# Patient Record
Sex: MALE | Race: OTHER | HISPANIC OR LATINO | URBAN - METROPOLITAN AREA
[De-identification: names, ages, dates, MRNs, and addresses within clinical notes are randomized per-mention and may not be internally consistent; named-entity substitution may affect disease eponyms.]

---

## 2019-04-10 ENCOUNTER — EMERGENCY (EMERGENCY)
Facility: HOSPITAL | Age: 34
LOS: 1 days | Discharge: HOME | End: 2019-04-10
Attending: EMERGENCY MEDICINE
Payer: SELF-PAY

## 2019-04-10 VITALS
RESPIRATION RATE: 18 BRPM | SYSTOLIC BLOOD PRESSURE: 124 MMHG | TEMPERATURE: 98 F | HEIGHT: 65 IN | DIASTOLIC BLOOD PRESSURE: 66 MMHG | WEIGHT: 166.01 LBS | HEART RATE: 62 BPM | OXYGEN SATURATION: 95 %

## 2019-04-10 DIAGNOSIS — Z98.890 OTHER SPECIFIED POSTPROCEDURAL STATES: Chronic | ICD-10-CM

## 2019-04-10 PROCEDURE — 99284 EMERGENCY DEPT VISIT MOD MDM: CPT | Mod: 25

## 2019-04-10 PROCEDURE — T1013: CPT

## 2019-04-10 PROCEDURE — 99284 EMERGENCY DEPT VISIT MOD MDM: CPT

## 2019-04-10 PROCEDURE — 70486 CT MAXILLOFACIAL W/O DYE: CPT

## 2019-04-10 PROCEDURE — 70486 CT MAXILLOFACIAL W/O DYE: CPT | Mod: 26

## 2019-04-10 RX ORDER — IBUPROFEN 200 MG
600 TABLET ORAL ONCE
Qty: 0 | Refills: 0 | Status: COMPLETED | OUTPATIENT
Start: 2019-04-10 | End: 2019-04-10

## 2019-04-10 RX ADMIN — Medication 600 MILLIGRAM(S): at 12:07

## 2019-04-10 NOTE — ED ADULT NURSE NOTE - CHPI ED NUR SYMPTOMS NEG
no vomiting/no weakness/no change in level of consciousness/no seizure/no blurred vision/no abrasion/no loss of consciousness

## 2019-04-10 NOTE — ED STATDOCS - ATTENDING CONTRIBUTION TO CARE
I, Sagrario Miles, performed the initial face to face bedside interview with this patient regarding history of present illness, review of symptoms and relevant past medical, social and family history.  I completed an independent physical examination.  I was the initial provider who evaluated this patient. I have signed out the follow up of any pending tests (i.e. labs, radiological studies) to the ACP.  I have communicated the patient’s plan of care and disposition with the ACP.

## 2019-04-10 NOTE — ED ADULT NURSE NOTE - NSIMPLEMENTINTERV_GEN_ALL_ED
Implemented All Universal Safety Interventions:  [place] to call system. Call bell, personal items and telephone within reach. Instruct patient to call for assistance. Room bathroom lighting operational. Non-slip footwear when patient is off stretcher. Physically safe environment: no spills, clutter or unnecessary equipment. Stretcher in lowest position, wheels locked, appropriate side rails in place.
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
## 2019-04-10 NOTE — ED STATDOCS - PROGRESS NOTE DETAILS
PA note: No acute findings on CT maxillofacial. NSAIDS / ICE for pain. Strict return instructions ( worsening pain, severe HA, vomiting )

## 2019-04-10 NOTE — ED STATDOCS - MUSCULOSKELETAL, MLM
range of motion is not limited and there is no muscle tenderness. range of motion is not limited and there is no muscle tenderness. no midline cervical spine tenderness
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
## 2019-07-26 NOTE — ED ADULT NURSE NOTE - TOBACCO SCREENING (FROM THE ASSIST)
REVIEW OF SYSTEMS:    CONSTITUTIONAL: No weakness, fevers or chills  EYES/ENT: No visual changes;  No dysphagia  NECK: No pain or stiffness  RESPIRATORY: No cough, wheezing, hemoptysis; +shortness of breath  CARDIOVASCULAR: No chest pain or palpitations; +chronic lower extremity edema  GASTROINTESTINAL: No abdominal or epigastric pain. No nausea, vomiting, or hematemesis; No diarrhea or constipation. No melena or hematochezia.  GENITOURINARY: No dysuria, frequency or hematuria  NEUROLOGICAL: No numbness or weakness  SKIN: No itching, burning, rashes, or lesions   All other review of systems is negative unless indicated above. Statement Selected

## 2025-05-28 NOTE — ED ADULT NURSE NOTE - NSSUSCREENINGQ4_ED_ALL_ED
What Type Of Note Output Would You Prefer (Optional)?: Standard Output What Is The Reason For Today's Visit?: Full Body Skin Examination What Is The Reason For Today's Visit? (Being Monitored For X): concerning skin lesions on an annual basis How Severe Are Your Spot(S)?: moderate Additional History: Has a spot on right base of thumb. No